# Patient Record
Sex: FEMALE | Race: WHITE | NOT HISPANIC OR LATINO | Employment: FULL TIME | URBAN - METROPOLITAN AREA
[De-identification: names, ages, dates, MRNs, and addresses within clinical notes are randomized per-mention and may not be internally consistent; named-entity substitution may affect disease eponyms.]

---

## 2021-04-08 ENCOUNTER — TRANSCRIBE ORDERS (OUTPATIENT)
Dept: NUTRITION | Facility: HOSPITAL | Age: 26
End: 2021-04-08

## 2021-04-08 DIAGNOSIS — E66.01 MORBID OBESITY (HCC): Primary | ICD-10-CM

## 2021-05-20 ENCOUNTER — APPOINTMENT (OUTPATIENT)
Dept: NUTRITION | Facility: HOSPITAL | Age: 26
End: 2021-05-20

## 2025-05-01 NOTE — PROGRESS NOTES
Chief Complaint  Sleep problems    Subjective     History of Present Illness:  Barak Reyes is a 29 y.o. female with a history of bipolar disorder.  The patient is referred by Akanksha Soliz MD with primary care.  Patient has had difficulty with snoring.  Review of self-reported questionnaire notes symptoms including the aforementioned snoring, daytime sleepiness and fatigue, morning headaches, difficulty falling asleep, sleep talking which have been ongoing for more than 5 years.  Patient typically goes to bed at 10 PM waking at 5:15 AM on weekdays, and 11 PM waking at 9 AM on weekends.  She estimates an average of 6-7 hours of sleep per night and it takes approximately 1 to 2 hours for her to get to sleep.  The patient takes naps for 1 to 2 hours.  She denies use of tobacco, drinks alcohol monthly or less, and denies use of illicit or recreational drugs.  Patient drinks 3 cups regular coffee daily, and 1 regular cola daily.  Patient's significant other reports witnessed episodes of heavy snoring which occur continuously, nightly, and in all sleeping positions.  Patient is observed kicking and jerking frequently and talking in her sleep.    Further details are as follows:    Sherman Scale is (out of 24): Total score: 12     Estimated average amount of sleep per night: 6-7 hours  Number of times she wakes up at night: 1-2 times  Difficulty falling back asleep: not usually  It usually takes 1 to 2 hours to go to sleep.  She feels sleepy upon waking up: yes  Rotating or night shift work: no    Drowsiness/Sleepiness:  She exhibits the following:  excessive daytime sleepiness  excessive daytime fatigue  falls asleep watching TV  takes scheduled naps during the day    Snoring/Breathing:  She exhibits the following:  loud snoring, snores in all sleep positions, awakens with coughing, and morning headaches    Head Injury:  She exhibits the following:  No    Reflux:  She describes the following:  Wakes with  "nausea    Narcolepsy:  She exhibits the following:  none    RLS/PLMs:  She describes the following:  moves or jerks during sleep  discomfort in legs with an urge to move them    Insomnia:  She describes the following:  problems initiating sleep at night    Parasomnia:  She exhibits the following:  sleep talks  acts out dreams  frequent nightmares  grinds teeth    Weight:       05/06/25  0829   Weight: 116 kg (255 lb 14.4 oz)      Weight change in the last year:  gain: 0 lbs    The patient's relevant past medical, surgical, family, and social history reviewed and updated in Epic as appropriate.    Review of Systems   Constitutional: Negative.    HENT: Negative.     Eyes:  Positive for visual disturbance.   Respiratory: Negative.     Cardiovascular: Negative.    Gastrointestinal: Negative.    Endocrine: Negative.    Genitourinary: Negative.    Musculoskeletal:  Positive for back pain.   Skin: Negative.    Allergic/Immunologic: Negative.    Neurological:  Positive for dizziness.   Hematological: Negative.    Psychiatric/Behavioral: Negative.     All other systems reviewed and are negative.      PMH:    Past Medical History:   Diagnosis Date    Bipolar disorder      History reviewed. No pertinent surgical history.  OB History    No obstetric history on file.       Allergies   Allergen Reactions    Vancomycin Other (See Comments)     DramaFever       MEDS:  Prior to Admission medications    Medication Sig Start Date End Date Taking? Authorizing Provider   Levonorgestrel-Ethinyl Estrad (JAME-28 PO) Take 1 tablet by mouth daily.    Veronica Alvarez MD   lithium 300 MG tablet Take 100 mg by mouth 2 (two) times a day. 1 TAB IN THE AM, 2 TABS AT NIGHT    ProviderVeronica MD       FH:  History reviewed. No pertinent family history.    Objective   Vital Signs:  /70 (BP Location: Right arm, Patient Position: Sitting, Cuff Size: Adult)   Pulse 88   Temp 96.9 °F (36.1 °C) (Temporal)   Ht 160 cm (62.99\")   Wt 116 " kg (255 lb 14.4 oz)   SpO2 97%   BMI 45.34 kg/m²     Patient's (Body mass index is 45.34 kg/m².) indicates that they are morbidly obese (BMI > 40 or > 35 with obesity - related health condition) with health related conditions that include obstructive sleep apnea, hypertension, coronary heart disease, and diabetes mellitus . Weight is unchanged. BMI is is above average; BMI management plan is completed. We discussed portion control and increasing exercise.           Physical Exam  Vitals reviewed.   Constitutional:       Appearance: Normal appearance.   HENT:      Head: Normocephalic and atraumatic.      Nose: Nose normal.      Mouth/Throat:      Mouth: Mucous membranes are moist.   Cardiovascular:      Rate and Rhythm: Normal rate and regular rhythm.      Heart sounds: No murmur heard.     No friction rub. No gallop.   Pulmonary:      Effort: Pulmonary effort is normal. No respiratory distress.      Breath sounds: Normal breath sounds. No wheezing or rhonchi.   Neurological:      Mental Status: She is alert and oriented to person, place, and time.   Psychiatric:         Behavior: Behavior normal.       Mallampati Score: IV (only hard palate visible)    Result Review :              Assessment and Plan  Barak Reyes is a 29 y.o. female with a past medical history of bipolar disorder who presents for further evaluation of excessive daytime sleepiness and fatigue, nonrestorative sleep, and concerns for sleep disordered breathing and obstructive sleep apnea. The patient's symptoms, particularly heavy snoring, are concerning for significant sleep disordered breathing and obstructive sleep apnea. We will obtain a home sleep test for further evaluation.  The patient will return for follow-up and recommendations after test.  I have discussed weight loss as it pertains to obstructive sleep apnea.  Patient reports waking frequently with nausea.  It is unclear whether this is reflux related.  I have asked her to try taking  "a famotidine prior to going to bed to see if this is helpful.    Of further concern is patient's description of insomnia.  She notes they can take her several hours to get to sleep.  She states oftentimes her mind will not \"shut off\".  I discussed the complex nature of insomnia noting that bipolar disorder can indeed contribute to sleep onset and maintenance insomnia.  I have given the patient information on cognitive behavioral therapy for insomnia-CBT-I .  Additionally, patient describes episodes of parasomnia concerning for REM sleep disorder.  I note the patient has multiple psychiatric medications which can contribute to these episodes occurring.  I have also discussed the increased risk of neuromuscular disorder such as Parkinson's disease in patients with REM sleep disorder.  I have asked the patient to consider trying high dose melatonin to see if this is helpful.    Diagnoses and all orders for this visit:    1. Sleep apnea, unspecified type (Primary)  -     Home Sleep Study; Future    2. Snoring  -     Home Sleep Study; Future    3. Excessive daytime sleepiness  -     Home Sleep Study; Future    4. Psychophysiological insomnia    5. Parasomnia in conditions classified elsewhere    6. Morbid (severe) obesity due to excess calories                 I discussed the consequences of uncontrolled sleep apnea including hypertension, heart disease, diabetes, stroke, and dementia. I further discussed sleep apnea therapeutic options including CPAP, Weight loss, Oral dental appliance, and surgery.         Follow Up  Return for Follow up after study.  Patient was given instructions and counseling regarding her condition or for health maintenance advice. Please see specific information pulled into the AVS if appropriate.     FANNY Santos, ACNP-BC  Pulmonology, Critical Care, and Sleep Medicine  "

## 2025-05-06 ENCOUNTER — OFFICE VISIT (OUTPATIENT)
Dept: SLEEP MEDICINE | Age: 30
End: 2025-05-06
Payer: COMMERCIAL

## 2025-05-06 VITALS
HEIGHT: 63 IN | SYSTOLIC BLOOD PRESSURE: 122 MMHG | BODY MASS INDEX: 45.34 KG/M2 | HEART RATE: 88 BPM | DIASTOLIC BLOOD PRESSURE: 70 MMHG | WEIGHT: 255.9 LBS | TEMPERATURE: 96.9 F | OXYGEN SATURATION: 97 %

## 2025-05-06 DIAGNOSIS — E66.01 MORBID (SEVERE) OBESITY DUE TO EXCESS CALORIES: ICD-10-CM

## 2025-05-06 DIAGNOSIS — G47.19 EXCESSIVE DAYTIME SLEEPINESS: ICD-10-CM

## 2025-05-06 DIAGNOSIS — F51.04 PSYCHOPHYSIOLOGICAL INSOMNIA: ICD-10-CM

## 2025-05-06 DIAGNOSIS — G47.54 PARASOMNIA IN CONDITIONS CLASSIFIED ELSEWHERE: ICD-10-CM

## 2025-05-06 DIAGNOSIS — G47.30 SLEEP APNEA, UNSPECIFIED TYPE: Primary | ICD-10-CM

## 2025-05-06 DIAGNOSIS — R06.83 SNORING: ICD-10-CM

## 2025-05-06 RX ORDER — BUPROPION HYDROCHLORIDE 150 MG/1
150 TABLET ORAL DAILY
COMMUNITY